# Patient Record
Sex: FEMALE | HISPANIC OR LATINO | URBAN - METROPOLITAN AREA
[De-identification: names, ages, dates, MRNs, and addresses within clinical notes are randomized per-mention and may not be internally consistent; named-entity substitution may affect disease eponyms.]

---

## 2021-01-20 ENCOUNTER — EMERGENCY (EMERGENCY)
Facility: HOSPITAL | Age: 76
LOS: 1 days | Discharge: HOME | End: 2021-01-20
Admitting: EMERGENCY MEDICINE
Payer: MEDICARE

## 2021-01-20 VITALS
HEART RATE: 75 BPM | OXYGEN SATURATION: 95 % | TEMPERATURE: 98 F | SYSTOLIC BLOOD PRESSURE: 181 MMHG | RESPIRATION RATE: 18 BRPM | DIASTOLIC BLOOD PRESSURE: 72 MMHG

## 2021-01-20 DIAGNOSIS — Z20.822 CONTACT WITH AND (SUSPECTED) EXPOSURE TO COVID-19: ICD-10-CM

## 2021-01-20 DIAGNOSIS — R05 COUGH: ICD-10-CM

## 2021-01-20 DIAGNOSIS — I10 ESSENTIAL (PRIMARY) HYPERTENSION: ICD-10-CM

## 2021-01-20 PROCEDURE — 99284 EMERGENCY DEPT VISIT MOD MDM: CPT

## 2021-01-20 PROCEDURE — U0003: CPT

## 2021-01-20 PROCEDURE — 99283 EMERGENCY DEPT VISIT LOW MDM: CPT

## 2021-01-20 PROCEDURE — U0005: CPT

## 2021-01-20 NOTE — ED PROVIDER NOTE - CLINICAL SUMMARY MEDICAL DECISION MAKING FREE TEXT BOX
Patient is presenting to the Emergency Department requesting a COVID-19 test.  Patient has an unremarkable focused exam and looks well.  Nasopharyngeal PCR has been obtained and patient has been guided on how to obtain their results.  General COVID-19 discharge instructions have been given to the patient. Pt noted with asymptomatic elevated blood pressure. Pt reports she forgot to take her meds today. Advised to follow-up with PMD and take medication and given information regarding dangers of uncontrolled htn.

## 2021-01-20 NOTE — ED PROVIDER NOTE - NS ED ROS FT
CONSTITUTIONAL:  No fever, chills or bodyaches  HEENT:  No nasal congestion, sore throat or headache  PULM:  Reports mild cough, denies shortness of breath  CARD: No chest pain or palpitations  GI:  No diarrhea or vomiting

## 2021-01-20 NOTE — ED PROVIDER NOTE - PATIENT PORTAL LINK FT
You can access the FollowMyHealth Patient Portal offered by [facility] by registering at the following website: [email or web address]. By joining [facility]’s FollowMyHealth portal, you will also be able to view your health information using other applications (apps) compatible with our system.

## 2021-01-20 NOTE — ED PROVIDER NOTE - CARE PLAN
Principal Discharge DX:	Encounter for medical screening examination  Secondary Diagnosis:	Elevated blood pressure reading

## 2021-01-20 NOTE — ED PROVIDER NOTE - PHYSICAL EXAMINATION
CONSTITUTIONAL:  Generally well appearing, no acute distress, alert, awake and oriented  HEENT:  Moist mucous membranes, normal voice, airway patent  PULM:  No accessory muscle use, speaking in full sentences, lungs CTA b/l  SKIN:  Normal in appearance, normal color

## 2021-01-20 NOTE — ED PROVIDER NOTE - OBJECTIVE STATEMENT
Patient presenting to the Emergency Department requesting COVID-19 testing due to concern for exposure.  Reports mild cough. Denies the following symptoms: fever, chills, sore throat, shortness of breath, chest pain or bodyaches.

## 2021-01-20 NOTE — ED ADULT TRIAGE NOTE - CHIEF COMPLAINT QUOTE
pt requesting covid19 test, states she got her first dose of covid19 vaccine 2 days ago, feeling throat pain, cough, body aches

## 2021-01-21 LAB — SARS-COV-2 RNA SPEC QL NAA+PROBE: DETECTED
